# Patient Record
Sex: MALE | Race: ASIAN | NOT HISPANIC OR LATINO
[De-identification: names, ages, dates, MRNs, and addresses within clinical notes are randomized per-mention and may not be internally consistent; named-entity substitution may affect disease eponyms.]

---

## 2020-11-16 PROBLEM — Z00.00 ENCOUNTER FOR PREVENTIVE HEALTH EXAMINATION: Status: ACTIVE | Noted: 2020-11-16

## 2020-11-17 ENCOUNTER — NON-APPOINTMENT (OUTPATIENT)
Age: 65
End: 2020-11-17

## 2020-11-17 DIAGNOSIS — K63.5 POLYP OF COLON: ICD-10-CM

## 2020-11-17 RX ORDER — SODIUM SULFATE, POTASSIUM SULFATE, MAGNESIUM SULFATE 17.5; 3.13; 1.6 G/ML; G/ML; G/ML
17.5-3.13-1.6 SOLUTION, CONCENTRATE ORAL
Qty: 1 | Refills: 0 | Status: ACTIVE | COMMUNITY
Start: 2020-11-17 | End: 1900-01-01

## 2020-12-16 ENCOUNTER — OUTPATIENT (OUTPATIENT)
Dept: OUTPATIENT SERVICES | Facility: HOSPITAL | Age: 65
LOS: 1 days | End: 2020-12-16
Payer: MEDICARE

## 2020-12-16 VITALS
HEIGHT: 65 IN | OXYGEN SATURATION: 98 % | DIASTOLIC BLOOD PRESSURE: 82 MMHG | HEART RATE: 60 BPM | SYSTOLIC BLOOD PRESSURE: 124 MMHG | WEIGHT: 138.01 LBS | RESPIRATION RATE: 16 BRPM | TEMPERATURE: 97 F

## 2020-12-16 DIAGNOSIS — Z98.890 OTHER SPECIFIED POSTPROCEDURAL STATES: Chronic | ICD-10-CM

## 2020-12-16 DIAGNOSIS — C18.9 MALIGNANT NEOPLASM OF COLON, UNSPECIFIED: ICD-10-CM

## 2020-12-16 DIAGNOSIS — I10 ESSENTIAL (PRIMARY) HYPERTENSION: ICD-10-CM

## 2020-12-16 DIAGNOSIS — K63.5 POLYP OF COLON: ICD-10-CM

## 2020-12-16 DIAGNOSIS — I25.10 ATHEROSCLEROTIC HEART DISEASE OF NATIVE CORONARY ARTERY WITHOUT ANGINA PECTORIS: ICD-10-CM

## 2020-12-16 LAB
ANION GAP SERPL CALC-SCNC: 10 MMOL/L — SIGNIFICANT CHANGE UP (ref 7–14)
BUN SERPL-MCNC: 18 MG/DL — SIGNIFICANT CHANGE UP (ref 7–23)
CALCIUM SERPL-MCNC: 9.5 MG/DL — SIGNIFICANT CHANGE UP (ref 8.4–10.5)
CHLORIDE SERPL-SCNC: 102 MMOL/L — SIGNIFICANT CHANGE UP (ref 98–107)
CO2 SERPL-SCNC: 27 MMOL/L — SIGNIFICANT CHANGE UP (ref 22–31)
CREAT SERPL-MCNC: 0.93 MG/DL — SIGNIFICANT CHANGE UP (ref 0.5–1.3)
GLUCOSE SERPL-MCNC: 156 MG/DL — HIGH (ref 70–99)
HCT VFR BLD CALC: 41.8 % — SIGNIFICANT CHANGE UP (ref 39–50)
HGB BLD-MCNC: 13.5 G/DL — SIGNIFICANT CHANGE UP (ref 13–17)
MCHC RBC-ENTMCNC: 30.5 PG — SIGNIFICANT CHANGE UP (ref 27–34)
MCHC RBC-ENTMCNC: 32.3 GM/DL — SIGNIFICANT CHANGE UP (ref 32–36)
MCV RBC AUTO: 94.4 FL — SIGNIFICANT CHANGE UP (ref 80–100)
NRBC # BLD: 0 /100 WBCS — SIGNIFICANT CHANGE UP
NRBC # FLD: 0 K/UL — SIGNIFICANT CHANGE UP
PLATELET # BLD AUTO: 213 K/UL — SIGNIFICANT CHANGE UP (ref 150–400)
POTASSIUM SERPL-MCNC: 4 MMOL/L — SIGNIFICANT CHANGE UP (ref 3.5–5.3)
POTASSIUM SERPL-SCNC: 4 MMOL/L — SIGNIFICANT CHANGE UP (ref 3.5–5.3)
RBC # BLD: 4.43 M/UL — SIGNIFICANT CHANGE UP (ref 4.2–5.8)
RBC # FLD: 13.3 % — SIGNIFICANT CHANGE UP (ref 10.3–14.5)
SODIUM SERPL-SCNC: 139 MMOL/L — SIGNIFICANT CHANGE UP (ref 135–145)
WBC # BLD: 3.47 K/UL — LOW (ref 3.8–10.5)
WBC # FLD AUTO: 3.47 K/UL — LOW (ref 3.8–10.5)

## 2020-12-16 PROCEDURE — 93010 ELECTROCARDIOGRAM REPORT: CPT

## 2020-12-16 RX ORDER — SODIUM CHLORIDE 9 MG/ML
1000 INJECTION, SOLUTION INTRAVENOUS
Refills: 0 | Status: DISCONTINUED | OUTPATIENT
Start: 2020-12-24 | End: 2021-01-07

## 2020-12-16 NOTE — H&P PST ADULT - CARDIOVASCULAR COMMENTS
Hx CAD with 4 stents placed - pt unsure when - on Plavix /ASA 81 mg po daily - surgeon office called and [place] NP confirmed they will accept stopping 3-5 days preop - Cardio called and pt has to come back to office for CC and preop Plavix instructions - pt aware of need for stop date for Plavix

## 2020-12-16 NOTE — H&P PST ADULT - NSICDXPASTMEDICALHX_GEN_ALL_CORE_FT
PAST MEDICAL HISTORY:  CAD (coronary artery disease) 4 stents >5 yrs ago    Colon cancer     HTN (hypertension)

## 2020-12-16 NOTE — H&P PST ADULT - HISTORY OF PRESENT ILLNESS
Pt is a 64 yr old Mandarin speaking male scheduled for Colonoscopy Anesthesia and Upper Endoscopy Anesthesia with Dr Carbone tentatively 12/24/20 - pt is poor historian - hx of colon ca and   Pt denies COVID  Pt is a 64 yr old Mandarin speaking male scheduled for Colonoscopy Anesthesia and Upper Endoscopy Anesthesia with Dr Carbone tentatively 12/24/20 - pt is poor historian - hx of colon ca and need for screening - pt denies pain or bleeding   Pt denies COVID or recent travel   Pt to have COVID preop test

## 2020-12-16 NOTE — H&P PST ADULT - NSICDXPROBLEM_GEN_ALL_CORE_FT
PROBLEM DIAGNOSES  Problem: Colon cancer  Assessment and Plan: Pt scheduled for surgery and preop instructions including instructions for taking Famotidine  on the day of surgery, given verbally and with use of  written materials, and patient confirming understanding of such instructions using  teach back method.  OR Booking notified of ney precautions, CAD stents x4   Call to Cardio requesting preop Plavix instructions - pt must return to office for CC and instructions. They will call and inform patient - informed of surgeon requesting stop date of Plavix 3-5 days preop.   Email sent to Dr Carbone informing of same.  Pt also aware of need to get preop Plavix instructions - told to stay on ASA 81 mg po daily for stent protection       Problem: HTN (hypertension)  Assessment and Plan: Pt to take Telmisartan am DOS    Problem: CAD (coronary artery disease)  Assessment and Plan: Pt to take Metoprolol am DOS

## 2020-12-21 ENCOUNTER — LABORATORY RESULT (OUTPATIENT)
Age: 65
End: 2020-12-21

## 2020-12-21 ENCOUNTER — APPOINTMENT (OUTPATIENT)
Dept: DISASTER EMERGENCY | Facility: CLINIC | Age: 65
End: 2020-12-21

## 2020-12-23 RX ORDER — SODIUM CHLORIDE 9 MG/ML
1000 INJECTION, SOLUTION INTRAVENOUS
Refills: 0 | Status: DISCONTINUED | OUTPATIENT
Start: 2020-12-24 | End: 2021-01-07

## 2020-12-24 ENCOUNTER — APPOINTMENT (OUTPATIENT)
Dept: GASTROENTEROLOGY | Facility: HOSPITAL | Age: 65
End: 2020-12-24

## 2020-12-24 ENCOUNTER — OUTPATIENT (OUTPATIENT)
Dept: OUTPATIENT SERVICES | Facility: HOSPITAL | Age: 65
LOS: 1 days | Discharge: HOME | End: 2020-12-24
Payer: MEDICARE

## 2020-12-24 ENCOUNTER — RESULT REVIEW (OUTPATIENT)
Age: 65
End: 2020-12-24

## 2020-12-24 VITALS
HEART RATE: 59 BPM | OXYGEN SATURATION: 97 % | WEIGHT: 139.99 LBS | DIASTOLIC BLOOD PRESSURE: 81 MMHG | TEMPERATURE: 98 F | SYSTOLIC BLOOD PRESSURE: 137 MMHG | HEIGHT: 65 IN | RESPIRATION RATE: 18 BRPM

## 2020-12-24 VITALS
RESPIRATION RATE: 17 BRPM | SYSTOLIC BLOOD PRESSURE: 133 MMHG | HEART RATE: 68 BPM | DIASTOLIC BLOOD PRESSURE: 70 MMHG | OXYGEN SATURATION: 96 %

## 2020-12-24 DIAGNOSIS — K63.5 POLYP OF COLON: ICD-10-CM

## 2020-12-24 DIAGNOSIS — Z98.890 OTHER SPECIFIED POSTPROCEDURAL STATES: Chronic | ICD-10-CM

## 2020-12-24 PROCEDURE — 43235 EGD DIAGNOSTIC BRUSH WASH: CPT | Mod: GC

## 2020-12-24 PROCEDURE — 45380 COLONOSCOPY AND BIOPSY: CPT | Mod: GC

## 2020-12-24 PROCEDURE — 88305 TISSUE EXAM BY PATHOLOGIST: CPT | Mod: 26

## 2020-12-24 RX ADMIN — SODIUM CHLORIDE 30 MILLILITER(S): 9 INJECTION, SOLUTION INTRAVENOUS at 11:49

## 2020-12-31 ENCOUNTER — NON-APPOINTMENT (OUTPATIENT)
Age: 65
End: 2020-12-31

## 2025-01-09 ENCOUNTER — NON-APPOINTMENT (OUTPATIENT)
Age: 70
End: 2025-01-09

## 2025-01-09 DIAGNOSIS — R12 HEARTBURN: ICD-10-CM

## 2025-01-09 DIAGNOSIS — Z85.038 PERSONAL HISTORY OF OTHER MALIGNANT NEOPLASM OF LARGE INTESTINE: ICD-10-CM

## 2025-01-09 DIAGNOSIS — K31.A0 GASTRIC INTESTINAL METAPLASIA, UNSPECIFIED: ICD-10-CM

## 2025-01-09 RX ORDER — SODIUM SULFATE, POTASSIUM SULFATE AND MAGNESIUM SULFATE 1.6; 3.13; 17.5 G/177ML; G/177ML; G/177ML
17.5-3.13-1.6 SOLUTION ORAL
Qty: 1 | Refills: 0 | Status: ACTIVE | COMMUNITY
Start: 2025-01-09 | End: 1900-01-01

## 2025-01-11 PROBLEM — I10 ESSENTIAL (PRIMARY) HYPERTENSION: Chronic | Status: ACTIVE | Noted: 2020-12-16

## 2025-01-11 PROBLEM — C18.9 MALIGNANT NEOPLASM OF COLON, UNSPECIFIED: Chronic | Status: ACTIVE | Noted: 2020-12-16

## 2025-01-11 PROBLEM — I25.10 ATHEROSCLEROTIC HEART DISEASE OF NATIVE CORONARY ARTERY WITHOUT ANGINA PECTORIS: Chronic | Status: ACTIVE | Noted: 2020-12-16

## 2025-03-20 NOTE — ASU PATIENT PROFILE, ADULT - BRADEN SCORE (IF 18 OR LESS ACTIVATE SKIN INJURY RISK INCREASED GUIDELINE), MLM
Medicine for Cholesterol Control  Cholesterol is a waxy substance in your bloodstream. If there is too much of it in your blood, it can build up in the walls of your arteries. Over time, this buildup can lead to coronary disease.  Coronary disease can put Medicines can cause side effects. These often occur at the start of taking a new medicine. Side effects can include headache and upset stomach. Rarely you can have muscle aches. Tell your healthcare provider about any side effects you have.   When to call y Make a plan to have regular cholesterol checks. You may need to fast before getting this test. Also ask your provider about any side effects your medicines may cause. Let your provider know about any side effects you have.  You may need to take more than on · trouble passing urine or change in the amount of urine  · unusually weak or tired  · yellowing of eyes or skin  Side effects that usually do not require medical attention (report to your doctor or health care professional if they continue or are botherso Visit your doctor or health care professional for regular check-ups. You may need regular tests to make sure your liver is working properly.   Tell your doctor or health care professional right away if you get any unexplained muscle pain, tenderness, or wea 23

## 2025-03-21 ENCOUNTER — TRANSCRIPTION ENCOUNTER (OUTPATIENT)
Age: 70
End: 2025-03-21

## 2025-03-21 ENCOUNTER — APPOINTMENT (OUTPATIENT)
Dept: GASTROENTEROLOGY | Facility: HOSPITAL | Age: 70
End: 2025-03-21

## 2025-03-21 ENCOUNTER — RESULT REVIEW (OUTPATIENT)
Age: 70
End: 2025-03-21

## 2025-03-21 ENCOUNTER — OUTPATIENT (OUTPATIENT)
Dept: OUTPATIENT SERVICES | Facility: HOSPITAL | Age: 70
LOS: 1 days | Discharge: HOME | End: 2025-03-21
Payer: MEDICARE

## 2025-03-21 VITALS
HEART RATE: 78 BPM | DIASTOLIC BLOOD PRESSURE: 85 MMHG | SYSTOLIC BLOOD PRESSURE: 138 MMHG | WEIGHT: 139.99 LBS | TEMPERATURE: 98 F | OXYGEN SATURATION: 96 % | RESPIRATION RATE: 14 BRPM | HEIGHT: 64.17 IN

## 2025-03-21 VITALS
OXYGEN SATURATION: 96 % | SYSTOLIC BLOOD PRESSURE: 112 MMHG | HEART RATE: 75 BPM | RESPIRATION RATE: 15 BRPM | DIASTOLIC BLOOD PRESSURE: 71 MMHG

## 2025-03-21 DIAGNOSIS — Z98.890 OTHER SPECIFIED POSTPROCEDURAL STATES: Chronic | ICD-10-CM

## 2025-03-21 DIAGNOSIS — Z85.038 PERSONAL HISTORY OF OTHER MALIGNANT NEOPLASM OF LARGE INTESTINE: ICD-10-CM

## 2025-03-21 PROCEDURE — 88305 TISSUE EXAM BY PATHOLOGIST: CPT | Mod: 26

## 2025-03-21 PROCEDURE — 45378 DIAGNOSTIC COLONOSCOPY: CPT | Mod: GC,59

## 2025-03-21 PROCEDURE — 88342 IMHCHEM/IMCYTCHM 1ST ANTB: CPT | Mod: 26

## 2025-03-21 PROCEDURE — 88341 IMHCHEM/IMCYTCHM EA ADD ANTB: CPT | Mod: 26

## 2025-03-21 PROCEDURE — 43239 EGD BIOPSY SINGLE/MULTIPLE: CPT | Mod: GC

## 2025-03-21 RX ORDER — CLOPIDOGREL BISULFATE 75 MG/1
1 TABLET, FILM COATED ORAL
Refills: 0 | DISCHARGE

## 2025-03-21 RX ORDER — NITROGLYCERIN 20 MG/G
1 OINTMENT TOPICAL
Refills: 0 | DISCHARGE

## 2025-03-21 RX ORDER — AMLODIPINE BESYLATE 10 MG/1
1 TABLET ORAL
Refills: 0 | DISCHARGE

## 2025-03-21 RX ORDER — TELMISARTAN 20 MG/1
1 TABLET ORAL
Refills: 0 | DISCHARGE

## 2025-03-21 RX ORDER — METOPROLOL SUCCINATE 50 MG/1
1 TABLET, EXTENDED RELEASE ORAL
Refills: 0 | DISCHARGE

## 2025-03-21 RX ORDER — ATORVASTATIN CALCIUM 80 MG/1
1 TABLET, FILM COATED ORAL
Refills: 0 | DISCHARGE

## 2025-03-21 RX ORDER — ICOSAPENT ETHYL 500 MG/1
2 CAPSULE ORAL
Refills: 0 | DISCHARGE

## 2025-03-21 NOTE — PRE PROCEDURE NOTE - PRE PROCEDURE EVALUATION
Attending Physician: Dwayne                   Procedure: EGD COLONOSCOPY    Indication for Procedure: surveillance, GERD  ________________________________________________________  PAST MEDICAL & SURGICAL HISTORY:  Colon cancer      CAD (coronary artery disease)  4 stents >5 yrs ago      HTN (hypertension)      History of colon surgery  2018        ALLERGIES:  No Known Allergies    HOME MEDICATIONS:    AICD/PPM: [ ] yes   [x ] no    PERTINENT LAB DATA:                      PHYSICAL EXAMINATION:    vitals wnl    Constitutional: NAD  Neck:  supple  Respiratory: no respiratory stress, no audible wheezes  Cardiovascular: RR  Gastrointestinal: soft, NT/ND  Extremities: No peripheral edema  Neurological: Alert, no focal deficits  Psychiatric: Normal mood, normal affect  Skin: No rashes      COMMENTS:    The patient is a suitable candidate for the planned procedure unless box checked [ ]  No, explain:

## 2025-03-21 NOTE — ASU DISCHARGE PLAN (ADULT/PEDIATRIC) - NS MD DC FALL RISK RISK
For information on Fall & Injury Prevention, visit: [email or web address] OR  [email or web address] OR  https://www.cdc.gov/steadi/patient.html

## 2025-03-21 NOTE — ASU PREOP CHECKLIST - DENTURES
Problem: Somatic Disturbance (Anxiety Signs/Symptoms)  Goal: Improved Somatic Symptoms (Anxiety Signs/Symptoms)  Outcome: Ongoing, Not Progressing     Problem: Behavioral Health Plan of Care  Goal: Adheres to Safety Considerations for Self and Others  Outcome: Ongoing, Progressing  Goal: Absence of New-Onset Illness or Injury  Outcome: Ongoing, Progressing     Problem: Suicide Risk  Goal: Absence of Self-Harm  Outcome: Ongoing, Progressing     Problem: Suicidal Behavior  Goal: Suicidal Behavior is Absent or Managed  Outcome: Ongoing, Progressing     Problem: Fall Injury Risk  Goal: Absence of Fall and Fall-Related Injury  Outcome: Ongoing, Progressing  Intervention: Identify and Manage Contributors  Recent Flowsheet Documentation  Taken 5/10/2022 0832 by Carolina Amos RN  Medication Review/Management: medications reviewed  Intervention: Promote Injury-Free Environment  Recent Flowsheet Documentation  Taken 5/10/2022 0832 by Carolina Amos RN  Safety Promotion/Fall Prevention:   activity supervised   clutter free environment maintained     Problem: Pain Chronic (Persistent)  Goal: Acceptable Pain Control and Functional Ability  Outcome: Ongoing, Progressing  Intervention: Develop Pain Management Plan  Recent Flowsheet Documentation  Taken 5/10/2022 0819 by Carolina Amos RN  Pain Management Interventions: (at this time, emotional support given) declines  Intervention: Manage Persistent Pain  Recent Flowsheet Documentation  Taken 5/10/2022 0832 by Carolina Amos RN  Medication Review/Management: medications reviewed     Problem: Nutrition Imbalance (Depressive Signs/Symptoms)  Goal: Optimized Nutrition Intake  Outcome: Ongoing, Progressing     Problem: Behavioral Health Plan of Care  Goal: Plan of Care Review  Recent Flowsheet Documentation  Taken 5/10/2022 0832 by Carolina Amos RN  Plan of Care Reviewed With: patient  Patient Agreement with Plan of Care: agrees  Goal: Optimal Comfort and Wellbeing  Intervention:  "Monitor Pain and Promote Comfort  Recent Flowsheet Documentation  Taken 5/10/2022 0819 by Carolina Amos RN  Pain Management Interventions: (at this time, emotional support given) declines   Goal Outcome Evaluation:    Plan of Care Reviewed With: patient     Affect noted to be flat, endorsed an anxiety of 9/10, depression of 6/10 at the start of shift, declined interventions, denied other psych symptoms, contracted for safety. Patient reported a 6/10 pain on left ankle, 4/10 on the right lower back which was radiating to right upper back (declined interventions-emotional support given, lidocaine patch was placed. Patient was visible in the lounge at this time, withdrawn to self though, was pleasant on approach, was compliant with medications. Patient did not attend community meeting, noted to be pacing hallway when community meeting was going on. Patient was observed to be having a minimal delayed response during assessment, said I am spacing out\", will continue to monitor                " yes

## 2025-03-27 LAB — SURGICAL PATHOLOGY STUDY: SIGNIFICANT CHANGE UP

## 2025-03-31 ENCOUNTER — NON-APPOINTMENT (OUTPATIENT)
Age: 70
End: 2025-03-31

## 2025-04-23 ENCOUNTER — NON-APPOINTMENT (OUTPATIENT)
Age: 70
End: 2025-04-23

## 2025-04-23 DIAGNOSIS — I21.9 ACUTE MYOCARDIAL INFARCTION, UNSPECIFIED: ICD-10-CM

## 2025-04-23 DIAGNOSIS — N40.0 BENIGN PROSTATIC HYPERPLASIA WITHOUT LOWER URINARY TRACT SYMPMS: ICD-10-CM

## 2025-04-23 DIAGNOSIS — K63.5 POLYP OF COLON: ICD-10-CM

## 2025-04-23 DIAGNOSIS — E78.5 HYPERLIPIDEMIA, UNSPECIFIED: ICD-10-CM

## 2025-04-23 DIAGNOSIS — I10 ESSENTIAL (PRIMARY) HYPERTENSION: ICD-10-CM

## 2025-04-23 RX ORDER — CLOPIDOGREL 75 MG/1
75 TABLET, FILM COATED ORAL
Refills: 0 | Status: ACTIVE | COMMUNITY

## 2025-04-23 RX ORDER — AMLODIPINE BESYLATE 5 MG/1
5 TABLET ORAL
Refills: 0 | Status: ACTIVE | COMMUNITY

## 2025-04-23 RX ORDER — NITROGLYCERIN 0.4 MG/1
0.4 TABLET SUBLINGUAL
Refills: 0 | Status: ACTIVE | COMMUNITY

## 2025-04-23 RX ORDER — TELMISARTAN 40 MG/1
40 TABLET ORAL
Refills: 0 | Status: ACTIVE | COMMUNITY

## 2025-04-23 RX ORDER — ICOSAPENT ETHYL 1000 MG/1
1 CAPSULE ORAL
Refills: 0 | Status: ACTIVE | COMMUNITY

## 2025-04-23 RX ORDER — METOPROLOL SUCCINATE 25 MG/1
25 TABLET, EXTENDED RELEASE ORAL
Refills: 0 | Status: ACTIVE | COMMUNITY

## 2025-04-23 RX ORDER — ATORVASTATIN CALCIUM 40 MG/1
40 TABLET, FILM COATED ORAL
Refills: 0 | Status: ACTIVE | COMMUNITY

## 2025-05-28 NOTE — ASU PREOP CHECKLIST - HEIGHT IN INCHES
5 [0] : 2) Feeling down, depressed, or hopeless: Not at all (0) [PHQ-2 Negative - No further assessment needed] : PHQ-2 Negative - No further assessment needed [No Increased risk of SCA or SCD] : No Increased risk of SCA or SCD    [Yes] : Patient consents to screening. [Have you ever fainted, passed out or had an unexplained seizure suddenly and without warning, especially during exercise or in response] : Have you ever fainted, passed out or had an unexplained seizure suddenly and without warning, especially during exercise or in response to sudden loud noises such as doorbells, alarm clocks and ringing telephones? No [Have you ever had exercise-related chest pain or shortness of breath?] : Have you ever had exercise-related chest pain or shortness of breath? No [Has anyone in your immediate family (parents, grandparents, siblings) or other more distant relatives (aunts, uncles, cousins)  of heart] : Has anyone in your immediate family (parents, grandparents, siblings) or other more distant relatives (aunts, uncles, cousins)  of heart problems or had an unexpected sudden death before age 50 (This would include unexpected drownings, unexplained car accidents in which the relative was driving or sudden infant death syndrome.)? No [Are you related to anyone with hypertrophic cardiomyopathy or hypertrophic obstructive cardiomyopathy, Marfan syndrome, arrhythmogenic] : Are you related to anyone with hypertrophic cardiomyopathy or hypertrophic obstructive cardiomyopathy, Marfan syndrome, arrhythmogenic right ventricular cardiomyopathy, long QT syndrome, short QT syndrome, Brugada syndrome or catecholaminergic polymorphic ventricular tachycardia, or anyone younger than 50 years with a pacemaker or implantable defibrillator? No

## 2025-06-26 NOTE — ASU PATIENT PROFILE, ADULT - INTERNATIONAL TRAVEL
Morbid obesity-patient has tried Contrave in the past.  She is very interested in Wejovy.  Risks, benefits and side effects discussed.  Patient verbalizes understanding.  Decrease metformin to once daily - half in the morning and half at night.   Continue to monitor blood sugars -contact office with blood sugar readings greater than 200 or less than 80.  Fasting labs to be obtained  She will be notified of results as they become available.    Follow-up in 2 to 3 weeks or sooner as needed     No

## 2025-06-26 NOTE — ASU PATIENT PROFILE, ADULT - PATIENT REPRESENTATIVE NAME
Called patient to schedule procedure with Dr. Temitope Fuens, there was no answer.  Left message with my direct line [phone].         Silvio

## 2025-06-27 ENCOUNTER — APPOINTMENT (OUTPATIENT)
Dept: GASTROENTEROLOGY | Facility: HOSPITAL | Age: 70
End: 2025-06-27

## 2025-06-27 ENCOUNTER — RESULT REVIEW (OUTPATIENT)
Age: 70
End: 2025-06-27

## 2025-06-27 ENCOUNTER — OUTPATIENT (OUTPATIENT)
Dept: OUTPATIENT SERVICES | Facility: HOSPITAL | Age: 70
LOS: 1 days | End: 2025-06-27
Payer: MEDICARE

## 2025-06-27 ENCOUNTER — TRANSCRIPTION ENCOUNTER (OUTPATIENT)
Age: 70
End: 2025-06-27

## 2025-06-27 VITALS
WEIGHT: 139.99 LBS | SYSTOLIC BLOOD PRESSURE: 149 MMHG | OXYGEN SATURATION: 100 % | TEMPERATURE: 98 F | RESPIRATION RATE: 13 BRPM | DIASTOLIC BLOOD PRESSURE: 84 MMHG | HEIGHT: 63.78 IN | HEART RATE: 66 BPM

## 2025-06-27 VITALS
HEART RATE: 63 BPM | RESPIRATION RATE: 16 BRPM | DIASTOLIC BLOOD PRESSURE: 72 MMHG | OXYGEN SATURATION: 98 % | SYSTOLIC BLOOD PRESSURE: 116 MMHG

## 2025-06-27 DIAGNOSIS — Z98.890 OTHER SPECIFIED POSTPROCEDURAL STATES: Chronic | ICD-10-CM

## 2025-06-27 DIAGNOSIS — K63.5 POLYP OF COLON: ICD-10-CM

## 2025-06-27 PROCEDURE — 88305 TISSUE EXAM BY PATHOLOGIST: CPT | Mod: 26

## 2025-06-27 PROCEDURE — 45385 COLONOSCOPY W/LESION REMOVAL: CPT

## 2025-06-27 RX ORDER — SODIUM CHLORIDE 9 G/1000ML
500 INJECTION, SOLUTION INTRAVENOUS
Refills: 0 | Status: COMPLETED | OUTPATIENT
Start: 2025-06-27 | End: 2025-06-27

## 2025-06-27 RX ADMIN — SODIUM CHLORIDE 30 MILLILITER(S): 9 INJECTION, SOLUTION INTRAVENOUS at 08:46

## 2025-06-27 NOTE — PRE PROCEDURE NOTE - PRE PROCEDURE EVALUATION
Attending Physician:  Dr. Rice    Procedure: Colon    Indication for Procedure: Polyp  ________________________________________________________  PAST MEDICAL & SURGICAL HISTORY:  Colon cancer      CAD (coronary artery disease)  4 stents >5 yrs ago      HTN (hypertension)      History of colon surgery  2018        ALLERGIES:  No Known Allergies    HOME MEDICATIONS:  amLODIPine 5 mg oral tablet: 1 tab(s) orally  clopidogrel 75 mg oral tablet: 1 tab(s) orally  Lipitor 40 mg oral tablet: 1 tab(s) orally  metoprolol succinate 25 mg oral tablet, extended release: 1 tab(s) orally  Micardis 40 mg oral tablet: 1 tab(s) orally  nitroglycerin 0.4 mg sublingual tablet: 1 tab(s) sublingually  Vascepa 1 g oral capsule: 2 cap(s) orally    AICD/PPM: [ ] yes   [ x] no    PERTINENT LAB DATA:                      PHYSICAL EXAMINATION:    Height (cm): 162  Weight (kg): 63.5  BMI (kg/m2): 24.2  BSA (m2): 1.68T(C): 36.8  HR: 66  BP: 149/84  RR: 13  SpO2: 100%    Constitutional: NAD  HEENT: PERRLA, EOMI,    Neck:  No JVD  Respiratory: CTAB/L  Cardiovascular: S1 and S2  Gastrointestinal: BS+, soft, NT/ND  Extremities: No peripheral edema  Neurological: A/O x 3, no focal deficits  Psychiatric: Normal mood, normal affect  Skin: No rashes    ASA Class: I [ ]  II [ x]  III [ ]  IV [ ]    COMMENTS:    The patient is a suitable candidate for the planned procedure unless box checked [ ]  No, explain:    Risks and alternatives to the procedure discussed in detail. All questions answered.

## 2025-06-27 NOTE — ASU DISCHARGE PLAN (ADULT/PEDIATRIC) - FINANCIAL ASSISTANCE
[facility] provides services at a reduced cost to those who are determined to be eligible through [facility]’s financial assistance program. Information regarding [facility]’s financial assistance program can be found by going to [email or web address] or by calling [phone].

## 2025-07-01 LAB — SURGICAL PATHOLOGY STUDY: SIGNIFICANT CHANGE UP

## (undated) DEVICE — TUBING MEDI-VAC W MAXIGRIP CONNECTORS 1/4"X6'

## (undated) DEVICE — BIOPSY FORCEP COLD DISP

## (undated) DEVICE — TUBING SUCTION NONCONDUCTIVE 6MM X 12FT

## (undated) DEVICE — SALIVA EJECTOR (BLUE)

## (undated) DEVICE — ELCTR ECG CONDUCTIVE ADHESIVE

## (undated) DEVICE — DRSG BANDAID 0.75X3"

## (undated) DEVICE — DRSG CURITY GAUZE SPONGE 4 X 4" 12-PLY NON-STERILE

## (undated) DEVICE — BIOPSY FORCEP RADIAL JAW 4 STANDARD WITH NEEDLE

## (undated) DEVICE — DRSG 2X2

## (undated) DEVICE — LUBRICATING JELLY HR ONE SHOT 3G

## (undated) DEVICE — CONTAINER FORMALIN 10% 20ML

## (undated) DEVICE — ELCTR GROUNDING PAD ADULT COVIDIEN

## (undated) DEVICE — TRAP QUICK CATCH  SINGL CHAMBER

## (undated) DEVICE — GOWN LG

## (undated) DEVICE — CLAMP BX HOT RAD JAW 3

## (undated) DEVICE — SNARE EXACTO COLD 9MMX230CM

## (undated) DEVICE — CONTAINER FORMALIN 80ML YELLOW

## (undated) DEVICE — BASIN EMESIS 10IN GRADUATED MAUVE

## (undated) DEVICE — BITE BLOCK ADULT 20 X 27MM (GREEN)

## (undated) DEVICE — PACK IV START WITH CHG

## (undated) DEVICE — TUBING IV SET GRAVITY 3Y 100" MACRO

## (undated) DEVICE — CATH IV SAFE BC 22G X 1" (BLUE)

## (undated) DEVICE — DENTURE CUP PINK

## (undated) DEVICE — UNDERPAD LINEN SAVER 17 X 24"